# Patient Record
Sex: MALE | URBAN - METROPOLITAN AREA
[De-identification: names, ages, dates, MRNs, and addresses within clinical notes are randomized per-mention and may not be internally consistent; named-entity substitution may affect disease eponyms.]

---

## 2018-12-26 ENCOUNTER — OFFICE VISIT (OUTPATIENT)
Dept: FAMILY MEDICINE | Facility: CLINIC | Age: 28
End: 2018-12-26
Payer: COMMERCIAL

## 2018-12-26 VITALS
HEIGHT: 67 IN | SYSTOLIC BLOOD PRESSURE: 122 MMHG | WEIGHT: 154 LBS | TEMPERATURE: 98 F | DIASTOLIC BLOOD PRESSURE: 58 MMHG | BODY MASS INDEX: 24.17 KG/M2

## 2018-12-26 DIAGNOSIS — Z00.00 ROUTINE GENERAL MEDICAL EXAMINATION AT A HEALTH CARE FACILITY: Primary | ICD-10-CM

## 2018-12-26 PROCEDURE — 99395 PREV VISIT EST AGE 18-39: CPT | Performed by: FAMILY MEDICINE

## 2018-12-26 SDOH — HEALTH STABILITY: MENTAL HEALTH: HOW MANY STANDARD DRINKS CONTAINING ALCOHOL DO YOU HAVE ON A TYPICAL DAY?: 3 OR 4

## 2018-12-26 SDOH — HEALTH STABILITY: MENTAL HEALTH: HOW OFTEN DO YOU HAVE A DRINK CONTAINING ALCOHOL?: 2-4 TIMES A MONTH

## 2018-12-26 ASSESSMENT — ENCOUNTER SYMPTOMS
COUGH: 1
SORE THROAT: 0
MYALGIAS: 0
FEVER: 0
DIARRHEA: 0
DIZZINESS: 0
ARTHRALGIAS: 0
WEAKNESS: 0
EYE PAIN: 0
PARESTHESIAS: 0
JOINT SWELLING: 0
HEMATURIA: 0
FREQUENCY: 0
SHORTNESS OF BREATH: 0
NERVOUS/ANXIOUS: 1
ABDOMINAL PAIN: 0
CHILLS: 0
HEADACHES: 0
HEARTBURN: 0
HEMATOCHEZIA: 0
CONSTIPATION: 1
NAUSEA: 0
PALPITATIONS: 0
DYSURIA: 0

## 2018-12-26 ASSESSMENT — MIFFLIN-ST. JEOR: SCORE: 1642.17

## 2018-12-26 ASSESSMENT — PATIENT HEALTH QUESTIONNAIRE - PHQ9
SUM OF ALL RESPONSES TO PHQ QUESTIONS 1-9: 4
SUM OF ALL RESPONSES TO PHQ QUESTIONS 1-9: 4
10. IF YOU CHECKED OFF ANY PROBLEMS, HOW DIFFICULT HAVE THESE PROBLEMS MADE IT FOR YOU TO DO YOUR WORK, TAKE CARE OF THINGS AT HOME, OR GET ALONG WITH OTHER PEOPLE: NOT DIFFICULT AT ALL

## 2018-12-26 NOTE — PATIENT INSTRUCTIONS
Preventive Health Recommendations  Male Ages 21 - 25     Yearly exam:             See your health care provider every year in order to  o   Review health changes.   o   Discuss preventive care.    o   Review your medicines if your doctor has prescribed any.    You should be tested each year for STDs (sexually transmitted diseases).     Talk to your provider about cholesterol testing.      If you are at risk for diabetes, you should have a diabetes test (fasting glucose).    Shots: Get a flu shot each year. Get a tetanus shot every 10 years.     Nutrition:    Eat at least 5 servings of fruits and vegetables daily.     Eat whole-grain bread, whole-wheat pasta and brown rice instead of white grains and rice.     Get adequate calcium and Vitamin D.     Lifestyle    Exercise for at least 150 minutes a week (30 minutes a day, 5 days a week). This will help you control your weight and prevent disease.     Limit alcohol to one drink per day.     No smoking.     Wear sunscreen to prevent skin cancer.     See your dentist every six months for an exam and cleaning.       [facility]   Discharged by : shari  Paper scripts provided to patient : none     If you have any questions regarding your visit please contact your care team:     Team Gold                Clinic Hours Telephone Number     Dr. Martina Corbett, CNP  Rosa M Liu, CNP 7am-7pm  Monday - Thursday   7am-5pm  Fridays  [phone]   (Appointment scheduling available 24/7)     RN Line  [phone] option 2     Urgent Care - [place] and [place] - 11am-9pm Monday-Friday Saturday-Sunday- 9am-5pm     [place] -   5pm-9pm Monday-Friday Saturday-Sunday- 9am-5pm    [phone] - [place]    [phone] - [place]       For a Price Quote for your services, please call our Consumer Price Line at [phone].     What options do I have for visits at the  clinic other than the traditional office visit?     To expand how we care for you, many of our providers are utilizing electronic visits (e-visits) and telephone visits, when medically appropriate, for interactions with their patients rather than a visit in the clinic. We also offer nurse visits for many medical concerns. Just like any other service, we will bill your insurance company for this type of visit based on time spent on the phone with your provider. Not all insurance companies cover these visits. Please check with your medical insurance if this type of visit is covered. You will be responsible for any charges that are not paid by your insurance.   E-visits via [employer]: generally incur a $35.00 fee.     Telephone visits:  Time spent on the phone: *charged based on time that is spent on the phone in increments of 10 minutes. Estimated cost:   5-10 mins $30.00   11-20 mins. $59.00   21-30 mins. $85.00       Use [employer] (secure email communication and access to your chart) to send your primary care provider a message or make an appointment. Ask someone on your Team how to sign up for [employer].     As always, Thank you for trusting us with your health care needs!      [facility]:    Scheduling Appointments  [facility]  Call: [phone]    [facility]  Call: [phone]    [facility]  Call: [phone]    For Gastroenterology referrals   [facility], 4th Floor   [address] [place] [address]   Appointments: [phone]    WHERE TO GO FOR CARE?  Clinic    Make an appointment if you:       Are sick (cold, cough, flu, sore throat, earache or in pain).       Have a small injury (sprain, small cut, burn or broken bone).       Need a physical exam, Pap smear, vaccine or prescription refill.       Have questions about your health or medicines.    To reach  us:      Call [phone] ([phone]). Open 24 hours every day. (For counseling services, call [phone].)    Log into [employer] at [employer]. (Visit [employer] to create an account.) Hospital emergency room    An emergency is a serious or life- threatening problem that must be treated right away.    Call 911 or get to the hospital if you have:      Very bad or sudden:            - Chest pain or pressure         - Bleeding         - Head or belly pain         - Dizziness or trouble seeing, walking or                          Speaking      Problems breathing      Blood in your vomit or you are coughing up blood      A major injury (knocked out, loss of a finger or limb, rape, broken bone protruding from skin)    A mental health crisis. (Or call the Mental Health Crisis line at 1-207.688.1401 or Suicide Prevention Hotline at 1-859.608.6947.)    Open 24 hours every day. You don't need an appointment.     Urgent care    Visit urgent care for sickness or small injuries when the clinic is closed. You don't need an appointment. To check hours or find an urgent care near you, visit [email or web address]. Online care    Get online care from [facility] for more than 70 common problems, like colds, allergies and infections. Open 24 hours every day at:   [email or web address]   Need help deciding?    For advice about where to be seen, you may call your clinic and ask to speak with a nurse. We're here for you 24 hours every day.         If you are deaf or hard of hearing, please let us know. We provide many free services including sign language interpreters, oral interpreters, TTYs, telephone amplifiers, note takers and written materials.

## 2018-12-26 NOTE — PROGRESS NOTES
"SUBJECTIVE:   CC: Can Gant is an 27 year old male who presents for preventative health visit.     HPI        {additional problems to add (Optional):107827}    Today's PHQ-2 Score:   PHQ-2 ( 1999 Pfizer) 12/26/2018   Q1: Little interest or pleasure in doing things 1   Q2: Feeling down, depressed or hopeless 2   PHQ-2 Score 3   Q1: Little interest or pleasure in doing things Several days   Q2: Feeling down, depressed or hopeless More than half the days   PHQ-2 Score 3       Abuse: Current or Past(Physical, Sexual or Emotional)- No  Do you feel safe in your environment? Yes    Social History     Tobacco Use     Smoking status: Never Smoker     Smokeless tobacco: Never Used   Substance Use Topics     Alcohol use: Yes     Comment: occasionally     Alcohol Use 12/26/2018   If you drink alcohol do you typically have greater than 3 drinks per day OR greater than 7 drinks per week? No   {add AUDIT responses (Optional) (A score of 7 for adult men is an indication of hazardous drinking; a score of 8 or more is an indication of an alcohol use disorder.  A score of 7 or more for adult women is an indication of hazardous drinking or an alchohol use disorder):008939}    Last PSA: No results found for: PSA    Reviewed orders with patient. Reviewed health maintenance and updated orders accordingly - {Yes/No:088356::\"Yes\"}  {Chronicprobdata (Optional):871826}    Reviewed and updated as needed this visit by clinical staff  Allergies  Meds         Reviewed and updated as needed this visit by Provider        {HISTORY OPTIONS (Optional):872594}    Review of Systems  {MALE ROS (Optional):923869::\"CONSTITUTIONAL: NEGATIVE for fever, chills, change in weight\",\"INTEGUMENTARY/SKIN: NEGATIVE for worrisome rashes, moles or lesions\",\"EYES: NEGATIVE for vision changes or irritation\",\"ENT: NEGATIVE for ear, mouth and throat problems\",\"RESP: NEGATIVE for significant cough or SOB\",\"CV: NEGATIVE for chest pain, palpitations or peripheral " "edema\",\"GI: NEGATIVE for nausea, abdominal pain, heartburn, or change in bowel habits\",\" male: negative for dysuria, hematuria, decreased urinary stream, erectile dysfunction, urethral discharge\",\"MUSCULOSKELETAL: NEGATIVE for significant arthralgias or myalgia\",\"NEURO: NEGATIVE for weakness, dizziness or paresthesias\",\"PSYCHIATRIC: NEGATIVE for changes in mood or affect\"}    OBJECTIVE:   There were no vitals taken for this visit.    Physical Exam  {Exam Choices (Optional):410667}    {Diagnostic Test Results (Optional):601190::\"Diagnostic Test Results:\",\"none \"}    ASSESSMENT/PLAN:   {Diag Picklist:962327}    COUNSELING:   {MALE COUNSELING MESSAGES:149805::\"Reviewed preventive health counseling, as reflected in patient instructions\"}    BP Readings from Last 1 Encounters:   09/22/11 106/60 (13 %/ 20 %)*     *BP percentiles are based on the August 2017 AAP Clinical Practice Guideline for boys     Estimated body mass index is 21.73 kg/m  as calculated from the following:    Height as of 9/22/11: 1.676 m (5' 6\").    Weight as of 9/22/11: 61.1 kg (134 lb 9.6 oz).    {BP Counseling- Complete if BP >= 120/80  (Optional):092690}  {Weight Management Plan (ACO) Complete if BMI is abnormal-  Ages 18-64  BMI >24.9.  Age 65+ with BMI <23 or >30 (Optional):582270}     reports that  has never smoked. he has never used smokeless tobacco.  {Tobacco Cessation -- Complete if patient is a smoker (Optional):669168}    Counseling Resources:  ATP IV Guidelines  Pooled Cohorts Equation Calculator  FRAX Risk Assessment  ICSI Preventive Guidelines  Dietary Guidelines for Americans, 2010  USDA's MyPlate  ASA Prophylaxis  Lung CA Screening    Han Reddy MD  [facility]  "

## 2018-12-26 NOTE — PROGRESS NOTES
Answers for HPI/ROS submitted by the patient on 12/26/2018   Annual Exam:  Frequency of exercise:: 2-3 days/week  Getting at least 3 servings of Calcium per day:: NO  Diet:: Regular (no restrictions)  Taking medications regularly:: Yes  Medication side effects:: None  Bi-annual eye exam:: Yes  Dental care twice a year:: Yes  Sleep apnea or symptoms of sleep apnea:: None  Positive for the following: constipation, cough, nervous/anxious, mood changes  Negative for the following: abdominal pain, Blood in stool, Blood in urine, chest pain, chills, congestion, diarrhea, dizziness, ear pain, eye pain, fever, frequency, genital sores, headaches, hearing loss, heartburn, arthralgias, joint swelling, peripheral edema, myalgias, nausea, dysuria, palpitations, Skin sensation changes, sore throat, urgency, rash, shortness of breath, visual disturbance, weakness  impotence: No  penile discharge: No  Additional concerns today:: No  Duration of exercise:: 15-30 minutes  If you checked off any problems, how difficult have these problems made it for you to do your work, take care of things at home, or get along with other people?: Not difficult at all  PHQ9 TOTAL SCORE: 4

## 2018-12-26 NOTE — PROGRESS NOTES
SUBJECTIVE:   CC: Can Gant is an 27 year old male who presents for preventative health visit.     Physical   Annual:     Getting at least 3 servings of Calcium per day:  NO    Bi-annual eye exam:  Yes    Dental care twice a year:  Yes    Sleep apnea or symptoms of sleep apnea:  None    Diet:  Regular (no restrictions)    Frequency of exercise:  2-3 days/week    Duration of exercise:  15-30 minutes    Taking medications regularly:  Yes    Medication side effects:  None    Additional concerns today:  No    PHQ-2 Total Score: 3          Patient is here for a routine physical today.  No particular concerns or symptoms at this time.    Today's PHQ-2 Score:   PHQ-2 ( 1999 Pfizer) 12/26/2018   Q1: Little interest or pleasure in doing things 1   Q2: Feeling down, depressed or hopeless 2   PHQ-2 Score 3   Q1: Little interest or pleasure in doing things Several days   Q2: Feeling down, depressed or hopeless More than half the days   PHQ-2 Score 3     PHQ-9 SCORE 12/26/2018   PHQ-9 Total Score MyChart 4 (Minimal depression)   PHQ-9 Total Score 4       Abuse: Current or Past(Physical, Sexual or Emotional)- No  Do you feel safe in your environment? Yes    Social History     Tobacco Use     Smoking status: Light Tobacco Smoker     Types: Cigarettes, Other     Smokeless tobacco: Never Used     Tobacco comment: occasional ecigs   Substance Use Topics     Alcohol use: Yes     Frequency: 2-4 times a month     Drinks per session: 3 or 4     Comment: occasionally     Alcohol Use 12/26/2018   If you drink alcohol do you typically have greater than 3 drinks per day OR greater than 7 drinks per week? No   No flowsheet data found.    Last PSA: No results found for: PSA    Reviewed orders with patient. Reviewed health maintenance and updated orders accordingly - Yes  There is no problem list on file for this patient.    Past Surgical History:   Procedure Laterality Date     DENTAL SURGERY      wisdom teeth extraction       Social  "History     Tobacco Use     Smoking status: Light Tobacco Smoker     Types: Cigarettes, Other     Smokeless tobacco: Never Used     Tobacco comment: occasional ecigs   Substance Use Topics     Alcohol use: Yes     Frequency: 2-4 times a month     Drinks per session: 3 or 4     Comment: occasionally     Family History   Problem Relation Age of Onset     Cancer Mother         maybe lymphoma     Diabetes Maternal Grandfather      Other - See Comments Paternal Half-Brother         not much contact         No current outpatient medications on file.     Allergies   Allergen Reactions     Vicodin [Hydrocodone-Acetaminophen]      Ear problems       Reviewed and updated as needed this visit by clinical staff  Tobacco  Allergies  Meds  Problems  Med Hx  Surg Hx  Fam Hx  Soc Hx          Reviewed and updated as needed this visit by Provider  Tobacco  Allergies  Meds  Problems  Med Hx  Surg Hx  Fam Hx  Soc Hx             Review of Systems   Constitutional: Negative for chills and fever.   HENT: Negative for congestion, ear pain, hearing loss and sore throat.    Eyes: Negative for pain and visual disturbance.   Respiratory: Positive for cough. Negative for shortness of breath.    Cardiovascular: Negative for chest pain, palpitations and peripheral edema.   Gastrointestinal: Positive for constipation. Negative for abdominal pain, diarrhea, heartburn, hematochezia and nausea.   Genitourinary: Negative for discharge, dysuria, frequency, genital sores, hematuria, impotence and urgency.   Musculoskeletal: Negative for arthralgias, joint swelling and myalgias.   Skin: Negative for rash.   Neurological: Negative for dizziness, weakness, headaches and paresthesias.   Psychiatric/Behavioral: Positive for mood changes. The patient is nervous/anxious.          OBJECTIVE:   /58 (BP Location: Right arm, Patient Position: Sitting, Cuff Size: Adult Regular)   Temp 98  F (36.7  C) (Oral)   Ht 1.702 m (5' 7\")   Wt 69.9 kg " "(154 lb)   BMI 24.12 kg/m      Physical Exam  GENERAL: healthy, alert and no distress  EYES: Eyes grossly normal to inspection, PERRL and conjunctivae and sclerae normal  HENT: ear canals and TM's normal, nose and mouth without ulcers or lesions  NECK: no adenopathy, no asymmetry, masses, or scars and thyroid normal to palpation  RESP: lungs clear to auscultation - no rales, rhonchi or wheezes  CV: regular rate and rhythm, normal S1 S2, no S3 or S4, no murmur, click or rub, no peripheral edema and peripheral pulses strong  ABDOMEN: soft, nontender, no hepatosplenomegaly, no masses and bowel sounds normal   (male): normal male genitalia without lesions or urethral discharge, no hernia  MS: no gross musculoskeletal defects noted, no edema  SKIN: no suspicious lesions or rashes  NEURO: Normal strength and tone, mentation intact and speech normal  PSYCH: mentation appears normal, affect normal/bright    Diagnostic Test Results:  none     ASSESSMENT/PLAN:   1. Routine general medical examination at a health care facility  Appears in general good health.  Follow up for any other concerns.  Recheck 1-3 years for routine cares.      COUNSELING:   Reviewed preventive health counseling, as reflected in patient instructions       Regular exercise       Healthy diet/nutrition       Vision screening       Hearing screening       Immunizations    Declined: Influenza and TDAP due to Concerns about side effects/safety               Safe sex practices/STD prevention       HIV screeninx in teen years, 1x in adult years, and at intervals if high risk    BP Readings from Last 1 Encounters:   18 122/58     Estimated body mass index is 24.12 kg/m  as calculated from the following:    Height as of this encounter: 1.702 m (5' 7\").    Weight as of this encounter: 69.9 kg (154 lb).           reports that he has been smoking cigarettes and other.  he has never used smokeless tobacco.      Counseling Resources:  ATP IV " Guidelines  Pooled Cohorts Equation Calculator  FRAX Risk Assessment  ICSI Preventive Guidelines  Dietary Guidelines for Americans, 2010  USDA's MyPlate  ASA Prophylaxis  Lung CA Screening    Han Reddy MD  [facility]

## 2018-12-27 ASSESSMENT — PATIENT HEALTH QUESTIONNAIRE - PHQ9: SUM OF ALL RESPONSES TO PHQ QUESTIONS 1-9: 4

## 2019-09-07 ENCOUNTER — OFFICE VISIT (OUTPATIENT)
Dept: ORTHOPEDICS | Facility: CLINIC | Age: 29
End: 2019-09-07
Payer: COMMERCIAL

## 2019-09-07 ENCOUNTER — OFFICE VISIT (OUTPATIENT)
Dept: URGENT CARE | Facility: URGENT CARE | Age: 29
End: 2019-09-07
Payer: COMMERCIAL

## 2019-09-07 ENCOUNTER — ANCILLARY PROCEDURE (OUTPATIENT)
Dept: GENERAL RADIOLOGY | Facility: CLINIC | Age: 29
End: 2019-09-07
Attending: INTERNAL MEDICINE
Payer: COMMERCIAL

## 2019-09-07 VITALS
BODY MASS INDEX: 22.76 KG/M2 | DIASTOLIC BLOOD PRESSURE: 76 MMHG | WEIGHT: 145 LBS | SYSTOLIC BLOOD PRESSURE: 125 MMHG | HEIGHT: 67 IN

## 2019-09-07 VITALS
WEIGHT: 145 LBS | TEMPERATURE: 97.8 F | BODY MASS INDEX: 22.71 KG/M2 | OXYGEN SATURATION: 98 % | SYSTOLIC BLOOD PRESSURE: 125 MMHG | DIASTOLIC BLOOD PRESSURE: 76 MMHG | HEART RATE: 87 BPM

## 2019-09-07 DIAGNOSIS — S69.91XA INJURY OF RIGHT THUMB, INITIAL ENCOUNTER: Primary | ICD-10-CM

## 2019-09-07 DIAGNOSIS — S69.90XA THUMB INJURY: ICD-10-CM

## 2019-09-07 DIAGNOSIS — M20.011 MALLET THUMB OF RIGHT HAND: Primary | ICD-10-CM

## 2019-09-07 PROCEDURE — 99213 OFFICE O/P EST LOW 20 MIN: CPT | Performed by: INTERNAL MEDICINE

## 2019-09-07 PROCEDURE — 73140 X-RAY EXAM OF FINGER(S): CPT | Mod: RT

## 2019-09-07 RX ORDER — LORATADINE 10 MG/1
10 TABLET ORAL DAILY
COMMUNITY

## 2019-09-07 ASSESSMENT — MIFFLIN-ST. JEOR: SCORE: 1596.35

## 2019-09-07 NOTE — Clinical Note
9/7/2019       RE: Can Gant  [address]  [address]  [place] [facility] [place] [facility] [address]     Dear Colleague,    Thank you for referring your patient, Can Gant, to the [facility] WALK [facility] CLINIC at [facility]. Please see a copy of my visit note below.          SPORTS & ORTHOPEDIC WALK-IN VISIT 9/7/2019    Primary Care Physician: Dr. Remy garcias. Was dark out. Thinks he fell on thumb. States his thumb was deformed when he woke up this morning. Went to  and got xrays. Received splint. Pain is in IP joint of thumb. Can't straighten thumb all the way.     Reason for visit:     What part of your body is injured / painful?  right thumb    What caused the injury /pain? Fall    How long ago did your injury occur or pain begin? yesterday    What are your most bothersome symptoms? Pain and Swelling    How would you characterize your symptom?  aching, sharp and throbing    What makes your symptoms better? Ice, Other: splint    What makes your symptoms worse? Movement    Have you been previously seen for this problem? Yes,     Medical History:    Any recent changes to your medical history? No    Any new medication prescribed since last visit? No    Have you had surgery on this body part before? No    Social History:    Occupation:  at body shop     Handedness: Right    Exercise: 3-4 days/week    Review of Systems:    Do you have fever, chills, weight loss? No    Do you have any vision problems? No    Do you have any chest pain or edema? No    Do you have any shortness of breath or wheezing?  No    Do you have stomach problems? No    Do you have any numbness or focal weakness? No    Do you have diabetes? No    Do you have problems with bleeding or clotting? No    Do you have an rashes or other skin lesions? No           Again, thank you for allowing me to participate in the care of your patient.      Sincerely,    Peter Trejo MD

## 2019-09-07 NOTE — PROGRESS NOTES
"      SPORTS & ORTHOPEDIC WALK-IN VISIT 9/7/2019    Primary Care Physician: Dr. Alberto  Here today for right thumb injury.  Yesterday was backyard wrestling. Thinks he fell on thumb.  Does not recall particular injury.  States his thumb was deformed when he woke up this morning. Went to  and got xrays.  Told he has a tendon injury.  Received splint. Pain is in IP joint of thumb. Can't straighten thumb all the way.  No tingling or numbness.    Reason for visit:     What part of your body is injured / painful?  right thumb    What caused the injury /pain? Fall    How long ago did your injury occur or pain begin? yesterday    What are your most bothersome symptoms? Pain and Swelling    How would you characterize your symptom?  aching, sharp and throbing    What makes your symptoms better? Ice, Other: splint    What makes your symptoms worse? Movement    Have you been previously seen for this problem? Yes,     Medical History:    Any recent changes to your medical history? No    Any new medication prescribed since last visit? No    Have you had surgery on this body part before? No    Social History:    Occupation:  at body shop     Handedness: Right    Exercise: 3-4 days/week    Review of Systems:    Do you have fever, chills, weight loss? No    Do you have any vision problems? No    Do you have any chest pain or edema? No    Do you have any shortness of breath or wheezing?  No    Do you have stomach problems? No    Do you have any numbness or focal weakness? No    Do you have diabetes? No    Do you have problems with bleeding or clotting? No    Do you have an rashes or other skin lesions? No         Past Medical History, Current Medications, and Allergies are reviewed in the electronic medical record as appropriate.       EXAM:/76   Ht 1.702 m (5' 7\")   Wt 65.8 kg (145 lb)   BMI 22.71 kg/m      General  - alert, pleasant, no distress  CV  - normal radial pulse, cap refill brisk  Musculoskeletal " -right thumb  - inspection: no atrophy, normal joint alignment, no swelling  - palpation: TTP over the dorsal aspect of the IP joint.  No TTP over the radial, ulnar, volar aspect.  No TTP at the MCP joint.  - ROM:  MCP 90 deg flexion   0 deg extension   IP 90 deg flexion   45 deg extension  - strength: Unable to extend thumb at IP joint against resistance.  Otherwise 5/5  strength, 5/5 flexion  - special tests:  None  Neuro  - no numbness, no motor deficit, grossly normal coordination, normal muscle tone  Skin  - no ecchymosis, erythema, warmth, or induration, no obvious rash    Imaging: Previously performed x-rays of the right thumb reviewed independently demonstrating flexion deformity at the IP joint of the thumb with no fracture noted.  See EMR for formal radiology report.      Assessment: Patient is a 28 year old male with right mallet thumb    Recommendations:   Reviewed imaging and assessment the patient detail  Patient was provided a stack splint as well as a referral to hand therapy.  He will follow-up with hand therapy for better fitting splint fabrication.  He will continue to follow-up with hand therapy at intervals for splint adjustments.  Follow-up in clinic in 6 weeks or sooner if there are problems or concerns.    Peter Trejo MD

## 2019-09-12 ENCOUNTER — THERAPY VISIT (OUTPATIENT)
Dept: OCCUPATIONAL THERAPY | Facility: CLINIC | Age: 29
End: 2019-09-12
Attending: FAMILY MEDICINE
Payer: COMMERCIAL

## 2019-09-12 DIAGNOSIS — M20.011 MALLET THUMB OF RIGHT HAND: Primary | ICD-10-CM

## 2019-09-12 DIAGNOSIS — M25.649 THUMB JOINT STIFFNESS: ICD-10-CM

## 2019-09-12 PROCEDURE — 97760 ORTHOTIC MGMT&TRAING 1ST ENC: CPT | Mod: GO | Performed by: OCCUPATIONAL THERAPIST

## 2019-09-12 PROCEDURE — 97165 OT EVAL LOW COMPLEX 30 MIN: CPT | Mod: GO | Performed by: OCCUPATIONAL THERAPIST

## 2019-09-12 NOTE — PROGRESS NOTES
[facility] Initial Evaluation     Current Date: 9/12/2019    Diagnosis: Right thumb mallet finger  DOI: 9/7/19    Referring MD: Peter Rivas MD    Subjective:    Can Lewis Gant being seen for thumb injury.   Problem began 9/7/2019. Where condition occurred: during recreation / sport.Problem occurred: landed on thumb from a fall  and reported as 5/10 on pain scale. General health as reported by patient is fair and good. Pertinent medical history includes:  None.    Surgeries include:  None.  Current medications:  None.   Primary job tasks include:  Driving, lifting/carrying, prolonged standing and repetitive tasks.  Pain is described as aching and sharp and is constant and intermittent. Pain is worse during the day. Since onset symptoms are gradually improving. Special tests:  X-ray.  There was none improvement following previous treatment.   Patient is automotive paiter. Restrictions include:  Working in normal job without restrictions.      Red flags:  None as reported by patient.       Current occupation is   Currently working in normal job without restrictions  Job Tasks: Lifting, Carrying, Prolonged Standing, Pushing, Pulling, Repetitive Tasks      Occupational Profile Information:  Right hand dominant  Prior functional level:  no limitations  Patient reports symptoms of   Barriers include:none  Mobility: No difficulty  Transportation: drives  Leisure activities/hobbies: recreational sports-football, basketball, kayak, paddle board, bikes      Functional Outcome Measure:   Upper Extremity Functional Index Score:    29/80  (A lower score indicates greater disability.)          Objective:  Pain Level (Scale 0-10):   9/12/2019   At Rest 5   With Use 7     Pain Description:  Date 9/12/2019   Location thumb   Pain Quality Sharp and Throbbing   Frequency intermittent or daily     Pain is worst  daytime   Exacerbated by  use   Relieved by NSAIDs and rest   Progression Gradually getting  better.          Edema:  MILD  Sensation:  WNL throughout all nerve distributions; per patient report    ROM:  Contraindicated     Strength:  Pt has functional strength throughout the rest of his hand as he is continuing to work.    Assessment:  Patient presents with symptoms consistent with diagnosis of thumb mallet,  with conservative intervention.     Patient's limitations or Problem List includes:  Pain and Decreased ROM/motion of the right thumb which interferes with the patient's ability to perform Self Care Tasks (dressing, hygiene/toileting), Work Tasks, Recreational Activities and Household Chores as compared to previous level of function.    Rehab Potential:  Excellent - Return to full activity, no limitations    Patient will benefit from skilled Occupational Therapy to increase tendon adhesion to return to previous activity level and resume normal daily tasks and to reach their rehab potential.    Barriers to Learning:  No barrier    Communication Issues:  Patient appears to be able to clearly communicate and understand verbal and written communication and follow directions correctly.    Chart Review: Chart Review and Simple history review with patient    Identified Performance Deficits: bathing/showering, dressing, hygiene and grooming, health management and maintenance, home establishment and management, meal preparation and cleanup, work, volunteer activities, play and leisure activities    Assessment of Occupational Performance:  3-5 Performance Deficits    Clinical Decision Making (Complexity): Low complexity    Treatment Explanation:  The following has been discussed with the patient:  RX ordered/plan of care  Anticipated outcomes  Possible risks and side effects    Plan:  Frequency:  1 X week, once daily  Duration:  for 6 weeks    Treatment Plan:   Orthotic Fabrication:  Static orthosis  Discharge Plan:  Achieve all LTG.  Independent in home treatment program.  Reach maximal therapeutic  benefit.    Home Exercise Program:  Mallet Finger orthosis, full time, only removed for hygiene and tip kept straight during that time  Washing and hygiene of the thumb    Next Visit:  Check orthosis and edema

## 2019-09-13 NOTE — PROGRESS NOTES
[facility] Progress Note        Cris Laureano MD, MPH  9/7/2019        History:      Can Gant is a pleasant 28 year old year old male seen for evaluation of right thumb injury/pain.  Patient is right-handed.  He states that he was wrestling with a friend last evening and injured his right thumb.  He denies any numbness or weakness of the right hand.  No previous injury to the right hand is referred.  No other injuries are reported.            Assessment and Plan:          Right thumb injury:    - XR Finger Right G/E 2 Views: No fracture or dislocation was noted per radiologist interpretation.    A splint/boot walker was applied to right thumb for support.  Patient was directed to the walk-in urgent care clinic now for orthopedics at [facility].  He agrees with this plan.  Advise patient to take Tylenol, 650 mg, PO Q 6hrs PRN, alternating with Ibuprofen 400-600 mg, PO Q 6hrs PRN.  Advised patient to apply ice pack to right thumb for 10 minutes Q hr while awake.  Advised patient to rest and keep the right hand elevated.                     Physical Exam:      /76   Pulse 87   Temp 97.8  F (36.6  C) (Oral)   Wt 65.8 kg (145 lb)   SpO2 98%   BMI 22.71 kg/m       Constitutional: Patient is in no distress The patient is pleasant and cooperative.   HEENT: Head:  Head is atraumatic, normocephalic.    Eyes: Pupils are equal, round and reactive to light and accomodation.  Sclera is non-icteric. No conjunctival injection, or exudate noted. Extraocular motion is intact. Visual acuity is intact bilaterally.  Ears:  External acoustic canals are patent and clear.  There is no erythema and bulging( exudate)  of the ( R/L ) tympanic membrane(s ).   Nose:  No Nasal congestion w/o drainage or mucosal ulceration is noted.  Throat:  Oral mucosa is moist.  No oral lesions are noted.  No posterior pharyngeal hyperemia nor exudate noted.     Neck Supple.  There is no cervical  lymphadenopathy.  No nuchal rigidity noted.  There is no meningismus.     Cardiovascular: Heart is regular to rate and rhythm.  No murmur is noted.     Lungs: Clear in the anterior and posterior pulmonary fields.   Abdomen:     Back    Extremeties  patient has swelling of right thumb interphalangeal joint with a flexed position and inability to extend the right thumb at the interphalangeal joint.  No neurovascular compromise of the right thumb is noted.  Examination of the right hand is otherwise normal.  Ulnar and radial pulses are palpable and full.   Neuro: No focal deficit.   Skin No petechiae or purpura is noted.  There is no rash.   Mood Normal              Data:      All new lab and imaging data was reviewed.   Results for orders placed or performed in visit on 09/22/11   X-ray Skull less than 4 vws    Impression       SKULL LESS THAN FOUR VIEWS Sep 22, 2011 9:36:00 AM     HISTORY: Head injury, pain top of the scalp.     COMPARISON: None.     FINDINGS: Calvarium is radiographically within normal limits.  Paranasal sinuses are normally aerated.     IMPRESSION: Two views of the skull are radiographically within normal  limits. If there is continued clinical concern for occult injury, CT  should be performed.

## 2019-09-26 ENCOUNTER — THERAPY VISIT (OUTPATIENT)
Dept: OCCUPATIONAL THERAPY | Facility: CLINIC | Age: 29
End: 2019-09-26
Payer: COMMERCIAL

## 2019-09-26 DIAGNOSIS — M25.649 THUMB JOINT STIFFNESS: ICD-10-CM

## 2019-09-26 PROCEDURE — 97535 SELF CARE MNGMENT TRAINING: CPT | Mod: GO | Performed by: OCCUPATIONAL THERAPIST

## 2019-09-26 NOTE — PROGRESS NOTES
[facility] Initial Evaluation     Current Date: 9/26/2019    Diagnosis: Right thumb mallet finger  DOI: 9/7/19    Referring MD: Peter Rivas MD      Objective:  Pain Level (Scale 0-10):   9/12/2019 9/26/19   At Rest 5 0   With Use 7 3     Pain Description:  Date 9/12/2019   Location thumb   Pain Quality Sharp and Throbbing   Frequency intermittent or daily     Pain is worst  daytime   Exacerbated by  use   Relieved by NSAIDs and rest   Progression Gradually getting better.          Edema:  MILD  Sensation:  WNL throughout all nerve distributions; per patient report    ROM:  Contraindicated     Strength:  Pt has functional strength throughout the rest of his hand as he is continuing to work.    Home Exercise Program:  Mallet Finger orthosis, full time, only removed for hygiene and tip kept straight during that time  Washing and hygiene of the thumb    Next Visit:  Check orthosis and edema and skin

## 2019-10-10 ENCOUNTER — THERAPY VISIT (OUTPATIENT)
Dept: OCCUPATIONAL THERAPY | Facility: CLINIC | Age: 29
End: 2019-10-10
Payer: COMMERCIAL

## 2019-10-10 DIAGNOSIS — M25.649 THUMB JOINT STIFFNESS: ICD-10-CM

## 2019-10-10 PROCEDURE — 97535 SELF CARE MNGMENT TRAINING: CPT | Mod: GO | Performed by: OCCUPATIONAL THERAPIST

## 2019-10-20 ENCOUNTER — OFFICE VISIT (OUTPATIENT)
Dept: ORTHOPEDICS | Facility: CLINIC | Age: 29
End: 2019-10-20
Payer: COMMERCIAL

## 2019-10-20 VITALS — BODY MASS INDEX: 22.76 KG/M2 | WEIGHT: 145 LBS | HEIGHT: 67 IN

## 2019-10-20 DIAGNOSIS — M20.011 MALLET THUMB OF RIGHT HAND: Primary | ICD-10-CM

## 2019-10-20 ASSESSMENT — MIFFLIN-ST. JEOR: SCORE: 1596.35

## 2019-10-20 NOTE — LETTER
"10/20/2019       RE: Can Gant  [address]  [address]  [place] [facility] [address]     Dear Colleague,    Thank you for referring your patient, Can Gant, to the [facility]TS AND ORTHOPAEDIC WALK IN CLINIC at [facility]. Please see a copy of my visit note below.          SPORTS & ORTHOPEDIC WALK-IN FOLLOW-UP VISIT 10/20/2019    Interval History:     Follow up reason: Recheck thumb    Date of injury: 9/6/19    Date last seen: 9/7/19    Following Therapeutic Plan: Yes     Pain: Improving    Function: Improving    Interval History: Has been doing well in extension splint.  Has seen hand therapy a few times and adjusted and fit his splint.  Fits comfortably.  No new issues or concerns.  Wearing splint consistently.     Medical History:    Any recent changes to your medical history? No    Any new medication prescribed since last visit? No    Review of Systems:    Do you have fever, chills, weight loss? No    Do you have any vision problems? No    Do you have any chest pain or edema? No    Do you have any shortness of breath or wheezing?  No    Do you have stomach problems? No    Do you have any numbness or focal weakness? No    Do you have diabetes? No    Do you have problems with bleeding or clotting? No    Do you have an rashes or other skin lesions? No           Past Medical History, Current Medications, and Allergies are reviewed in the electronic medical record as appropriate.       EXAM:Ht 1.702 m (5' 7\")   Wt 65.8 kg (145 lb)   BMI 22.71 kg/m       General  - alert, pleasant, no distress  CV  - normal radial pulse, cap refill brisk  Musculoskeletal - right thumb  - inspection: no atrophy, normal joint alignment, no swelling  - palpation: no CMC tenderness, no soft tissue tenderness, no tenderness at the anatomical snuffbox  - ROM:  MCP 70 deg flexion   0 deg extension   IP 45 deg flexion, 90 deg on left,   0 deg extension, symmetric with contralateral " side  - strength: able to gently flex and extend at ip against resistance.   - special tests:  none  Neuro  - no numbness, no motor deficit, grossly normal coordination, normal muscle tone  Skin  - no ecchymosis, erythema, warmth, or induration, no obvious rash        Imaging: no new imaging      Assessment: Patient is a 28 year old male 6 weeks s/p mallet finger injury to right thumb. Extension restored to symmetric after 6 weeks in splint.     Recommendations:   Reviewed assessment with the patient in detail  Gradually reduce splint use.  Recommended continued use of splint at night and with high risk activities such as playing sports and recreational activities for the next 4 weeks.  Otherwise can gradually decrease use of splint during the day.  Continue to follow with hand therapy.  Follow-up in clinic if there are any problems.      Peter Trejo MD              Again, thank you for allowing me to participate in the care of your patient.      Sincerely,    Peter Trejo MD

## 2019-10-22 ENCOUNTER — THERAPY VISIT (OUTPATIENT)
Dept: OCCUPATIONAL THERAPY | Facility: CLINIC | Age: 29
End: 2019-10-22
Payer: COMMERCIAL

## 2019-10-22 DIAGNOSIS — M25.649 THUMB JOINT STIFFNESS: ICD-10-CM

## 2019-10-22 PROCEDURE — 97110 THERAPEUTIC EXERCISES: CPT | Mod: GO | Performed by: OCCUPATIONAL THERAPIST

## 2019-10-22 PROCEDURE — 97140 MANUAL THERAPY 1/> REGIONS: CPT | Mod: GO | Performed by: OCCUPATIONAL THERAPIST

## 2019-10-22 NOTE — PROGRESS NOTES
[facility] SOAP    Current Date: 10/22/2019    Diagnosis: Right thumb mallet finger  DOI: 9/7/19    Referring MD: Peter Rivas MD      Objective:  Pain Level (Scale 0-10):   9/12/2019 9/26/19 10/22/19   At Rest 5 0 0   With Use 7 3 8     Pain Description:  Date 9/12/2019 10/22/19   Location thumb IP joint   Pain Quality Sharp and Throbbing Burning sensation   Frequency intermittent or daily   intermittent   Pain is worst  daytime daytime   Exacerbated by  use Skin irritation   Relieved by NSAIDs and rest Removing orthosis and letting skin breathe   Progression Gradually getting better.  Gradually getting better         Edema:  MILD  Sensation:  WNL throughout all nerve distributions; per patient report    ROM:    ROM  Thumb 10/22/2019 10/22/2019   AROM  (PROM) L R   MP /95 /95   IP /75 /27   RABD 40 45   PABD 50 55   Retropulsion     Kapandji Opposition Scale (0-10/10)  10         Strength:  Pt has functional strength throughout the rest of his hand as he is continuing to work.    Home Exercise Program:  Mallet Finger orthosis, nights and work/heavy activity  Washing and hygiene of the thumb  AROM, blocking    Next Visit:  Progress AROM as tolerated  Desensitization

## 2019-11-05 ENCOUNTER — THERAPY VISIT (OUTPATIENT)
Dept: OCCUPATIONAL THERAPY | Facility: CLINIC | Age: 29
End: 2019-11-05
Payer: COMMERCIAL

## 2019-11-05 DIAGNOSIS — M25.649 THUMB JOINT STIFFNESS: ICD-10-CM

## 2019-11-05 PROCEDURE — 97110 THERAPEUTIC EXERCISES: CPT | Mod: GO | Performed by: OCCUPATIONAL THERAPIST

## 2019-11-06 NOTE — PROGRESS NOTES
Discharge Summary - Hand Therapy    Patient did not return to therapy.  Assume all goals were met to patient's satisfaction. D/C from hand therapy.      [facility] SOAP    Current Date: 11/5/2019    Diagnosis: Right thumb mallet finger  DOI: 9/7/19    Referring MD: Peter Rivas MD      Objective:  Pain Level (Scale 0-10):   9/12/2019 9/26/19 10/22/19 11/5/19   At Rest 5 0 0 0   With Use 7 3 8 5     Pain Description:  Date 9/12/2019 10/22/19   Location thumb IP joint   Pain Quality Sharp and Throbbing Burning sensation   Frequency intermittent or daily   intermittent   Pain is worst  daytime daytime   Exacerbated by  use Skin irritation   Relieved by NSAIDs and rest Removing orthosis and letting skin breathe   Progression Gradually getting better.  Gradually getting better         Edema:  MILD-Moderate at the IP  Sensation:  WNL throughout all nerve distributions; per patient report      ROM  Thumb 10/22/2019 10/22/2019 11/5/19   AROM  (PROM) L R R   MP /95 /95 /90   IP /75 /27 -5/60   RABD 40 45    PABD 50 55    Retropulsion      Kapandji Opposition Scale (0-10/10)  10        Strength:  Pt has functional strength throughout the rest of his hand as he is continuing to work.    Home Exercise Program:  Mallet Finger orthosis, nights and work/heavy activity  Washing and hygiene of the thumb  AROM, blocking  Towel pushing with thumb    Next Visit:  Progress AROM as tolerated  Thumb IP Blocking  Putty resisted IP extension

## 2019-11-25 NOTE — PROGRESS NOTES
"      SPORTS & ORTHOPEDIC WALK-IN FOLLOW-UP VISIT 10/20/2019    Interval History:     Follow up reason: Recheck thumb    Date of injury: 9/6/19    Date last seen: 9/7/19    Following Therapeutic Plan: Yes     Pain: Improving    Function: Improving    Interval History: Has been doing well in extension splint.  Has seen hand therapy a few times and adjusted and fit his splint.  Fits comfortably.  No new issues or concerns.  Wearing splint consistently.     Medical History:    Any recent changes to your medical history? No    Any new medication prescribed since last visit? No    Review of Systems:    Do you have fever, chills, weight loss? No    Do you have any vision problems? No    Do you have any chest pain or edema? No    Do you have any shortness of breath or wheezing?  No    Do you have stomach problems? No    Do you have any numbness or focal weakness? No    Do you have diabetes? No    Do you have problems with bleeding or clotting? No    Do you have an rashes or other skin lesions? No           Past Medical History, Current Medications, and Allergies are reviewed in the electronic medical record as appropriate.       EXAM:Ht 1.702 m (5' 7\")   Wt 65.8 kg (145 lb)   BMI 22.71 kg/m      General  - alert, pleasant, no distress  CV  - normal radial pulse, cap refill brisk  Musculoskeletal - right thumb  - inspection: no atrophy, normal joint alignment, no swelling  - palpation: no CMC tenderness, no soft tissue tenderness, no tenderness at the anatomical snuffbox  - ROM:  MCP 70 deg flexion   0 deg extension   IP 45 deg flexion, 90 deg on left,   0 deg extension, symmetric with contralateral side  - strength: able to gently flex and extend at ip against resistance.   - special tests:  none  Neuro  - no numbness, no motor deficit, grossly normal coordination, normal muscle tone  Skin  - no ecchymosis, erythema, warmth, or induration, no obvious rash        Imaging: no new imaging      Assessment: Patient is a 28 " Addended by: MEGHAN WEISS on: 11/25/2019 02:30 PM     Modules accepted: Orders     year old male 6 weeks s/p mallet finger injury to right thumb. Extension restored to symmetric after 6 weeks in splint.     Recommendations:   Reviewed assessment with the patient in detail  Gradually reduce splint use.  Recommended continued use of splint at night and with high risk activities such as playing sports and recreational activities for the next 4 weeks.  Otherwise can gradually decrease use of splint during the day.  Continue to follow with hand therapy.  Follow-up in clinic if there are any problems.      Peter Trejo MD

## 2020-03-01 ENCOUNTER — HEALTH MAINTENANCE LETTER (OUTPATIENT)
Age: 30
End: 2020-03-01

## 2020-04-01 PROBLEM — M25.649 THUMB JOINT STIFFNESS: Status: RESOLVED | Noted: 2019-09-12 | Resolved: 2020-04-01

## 2020-12-14 ENCOUNTER — HEALTH MAINTENANCE LETTER (OUTPATIENT)
Age: 30
End: 2020-12-14

## 2021-04-18 ENCOUNTER — HEALTH MAINTENANCE LETTER (OUTPATIENT)
Age: 31
End: 2021-04-18

## 2021-10-02 ENCOUNTER — HEALTH MAINTENANCE LETTER (OUTPATIENT)
Age: 31
End: 2021-10-02

## 2022-05-14 ENCOUNTER — HEALTH MAINTENANCE LETTER (OUTPATIENT)
Age: 32
End: 2022-05-14

## 2022-09-03 ENCOUNTER — HEALTH MAINTENANCE LETTER (OUTPATIENT)
Age: 32
End: 2022-09-03

## 2023-06-03 ENCOUNTER — HEALTH MAINTENANCE LETTER (OUTPATIENT)
Age: 33
End: 2023-06-03